# Patient Record
Sex: FEMALE | Race: WHITE
[De-identification: names, ages, dates, MRNs, and addresses within clinical notes are randomized per-mention and may not be internally consistent; named-entity substitution may affect disease eponyms.]

---

## 2019-07-12 ENCOUNTER — HOSPITAL ENCOUNTER (EMERGENCY)
Dept: HOSPITAL 5 - ED | Age: 39
Discharge: HOME | End: 2019-07-12
Payer: MEDICAID

## 2019-07-12 VITALS — DIASTOLIC BLOOD PRESSURE: 83 MMHG | SYSTOLIC BLOOD PRESSURE: 125 MMHG
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
## 2019-07-12 DIAGNOSIS — Z79.899: ICD-10-CM

## 2019-07-12 DIAGNOSIS — S93.401A: Primary | ICD-10-CM
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
## 2019-07-12 PROCEDURE — 99283 EMERGENCY DEPT VISIT LOW MDM: CPT

## 2019-07-12 NOTE — EMERGENCY DEPARTMENT REPORT
ED Lower Extremity HPI





- General


Chief Complaint: Extremity Injury, Lower


Stated Complaint: FALL/R ANKLE PAIN


Time Seen by Provider: 07/12/19 12:09


Source: patient


Mode of arrival: Ambulatory


Limitations: No Limitations





- History of Present Illness


Initial Comments: 





Pt presents with right ankle and foot pain 


states she was carrying a table and a chair and tripped and had an inversion 

injury 


never injured before 


ambulatory with discomfort





no PMHx


allergy: NSAIDS -PUD





non smoker


rare ETOH 


no drug use 





MD Complaint: ankle injury (right), foot injury (right)


-: This morning


Injury: Ankle: Right, Foot: Right


Type of Injury: inversion


Place: work


Severity scale (0 -10): 8


Improves With: rest


Worsens With: weight bearing, movement


Associated Symptoms: swelling, able to partially bear weight





- Related Data


                                  Previous Rx's











 Medication  Instructions  Recorded  Last Taken  Type


 


traMADol [Ultram 50 MG tab] 50 mg PO Q6HR PRN #12 tablet 07/12/19 Unknown Rx











                                    Allergies











Allergy/AdvReac Type Severity Reaction Status Date / Time


 


No Known Allergies Allergy   Unverified 07/12/19 11:48














ED Review of Systems


ROS: 


Stated complaint: FALL/R ANKLE PAIN


Other details as noted in HPI





Comment: All other systems reviewed and negative





ED Past Medical Hx





- Past Medical History


Previous Medical History?: No





- Surgical History


Additional Surgical History: cosmetic





- Social History


Smoking Status: Never Smoker


Substance Use Type: Alcohol





- Medications


Home Medications: 


                                Home Medications











 Medication  Instructions  Recorded  Confirmed  Last Taken  Type


 


traMADol [Ultram 50 MG tab] 50 mg PO Q6HR PRN #12 tablet 07/12/19  Unknown Rx














ED Physical Exam





- General


Limitations: No Limitations


General appearance: alert, in no apparent distress





- Head


Head exam: Present: atraumatic, normocephalic





- Eye


Eye exam: Present: normal appearance





- ENT


ENT exam: Present: mucous membranes moist





- Expanded Lower Extremity Exam


  ** Right


Hip exam: Present: full ROM


Upper Leg exam: Present: normal inspection, full ROM


Knee exam: Present: normal inspection, full ROM


Lower Leg exam: Present: normal inspection, full ROM


Ankle exam: Present: full ROM, tenderness, swelling


Foot/Toe exam: Present: full ROM.  Absent: tenderness, swelling


Neuro vascular tendon exam: Present: no vascular compromise





- Neurological Exam


Neurological exam: Present: alert, oriented X3





- Psychiatric


Psychiatric exam: Present: normal affect, normal mood





- Skin


Skin exam: Present: warm, dry, intact, normal color.  Absent: rash





ED Course


                                   Vital Signs











  07/12/19





  12:09


 


Temperature 98.3 F


 


Pulse Rate 80


 


Respiratory 18





Rate 


 


Blood Pressure 125/83


 


O2 Sat by Pulse 100





Oximetry 














ED Lower Extremity MDM





- Radiology Data


Radiology results: report reviewed





Ordering Physician: DL WILLSON 


Date of Service: 07/12/19 


Procedure(s): XR foot 3+V RT 


Accession Number(s): [id number] 





cc: DL WILLSON 





Fluoro Time In Minutes: 





RIGHT ANKLE 3 VIEWS 


RIGHT FOOT 3 VIEWS 





INDICATION / CLINICAL INFORMATION: 


Pain in right foot and ankle after furniture fell on foot/ankle. 





COMPARISON: 


None available. 





FINDINGS: 





BONES and JOINT(S): No acute fracture or subluxation. No significant arthritis. 


SOFT TISSUES: No significant abnormality. 





ADDITIONAL FINDINGS: None. 





IMPRESSION: 


No acute findings. 





Signer Name: Dylan Yo MD 


Signed: 7/12/2019 1:06 PM 


Workstation Name: [id number] 








Transcribed By: MN 


Dictated By: Dylan Yo MD 


Electronically Authenticated By: Dylan Yo MD 


Signed Date/Time: 07/12/19 1306 











DD/DT: 07/12/19 1305 


TD/TT:





- Medical Decision Making





Assessment evaluated by this provider [facility].  She is a 38-year-old female comes in

for right ankle pain x-rays were negative for any acute fractures.  There is the

patient has a right ankle sprain patient be placed on tramadol since she has an 

ibuprofen allergy as well as a ankle stirrup.  Patient follow-up with primary 

care provider if symptoms persist or gets worse


Critical care attestation.: 


If time is entered above; I have spent that time in minutes in the direct care 

of this critically ill patient, excluding procedure time.








ED Disposition


Clinical Impression: 


 Ankle sprain





Disposition: DC-01 TO HOME OR SELFCARE


Is pt being admited?: No


Does the pt Need Aspirin: No


Condition: Stable


Instructions:  Ankle Sprain (ED), Ankle Stirrup Splint (ED)


Additional Instructions: 


Pain medication as prescribed.  Apply ice to your ankle.  Wear stirrup ankle 

stirrup as prescribed.


Prescriptions: 


traMADol [Ultram 50 MG tab] 50 mg PO Q6HR PRN #12 tablet


 PRN Reason: Pain


Referrals: 


VICENTA CHASE MD [Primary Care Provider] - 3-5 Days


Forms:  Work/School Release Form(ED)

## 2019-07-12 NOTE — XRAY REPORT
RIGHT ANKLE 3 VIEWS

RIGHT FOOT 3 VIEWS



INDICATION / CLINICAL INFORMATION:

Pain in right foot and ankle after furniture fell on foot/ankle.



COMPARISON:

None available.

 

FINDINGS:



BONES and JOINT(S): No acute fracture or subluxation. No significant arthritis.

SOFT TISSUES: No significant abnormality.



ADDITIONAL FINDINGS: None.



IMPRESSION:

No acute findings.



Signer Name: Dylan Yo MD 

Signed: 7/12/2019 1:06 PM

 Workstation Name: [id number]

## 2019-07-12 NOTE — EVENT NOTE
ED Screening Note


ED Screening Note: 








Pt presents with right ankle and foot pain 


states she was carrying a table and a chair and tripped and had an inversion 

injury 


never injured before 


ambulatory with discomfort





no PMHx


allergy: NSAIDS -PUD





non smoker


rare ETOH 


no drug use 





This initial assessment/diagnostic orders/clinical plan/treatment(s) is/are 

subject to change based on patients health status, clinical progression and re-

assessment by fellow clinical providers in the ED. Further treatment and workup 

at subsequent clinical providers discretion. Patient/guardian urged not to elope

from the ED as their condition may be serious if not clinically assessed and 

managed. 





Initial orders include: XR of the right ankle, XR of the right foot

## 2019-07-12 NOTE — XRAY REPORT
RIGHT ANKLE 3 VIEWS

RIGHT FOOT 3 VIEWS



INDICATION / CLINICAL INFORMATION:

Pain in right foot and ankle after furniture fell on foot/ankle.



COMPARISON:

None available.

 

FINDINGS:



BONES and JOINT(S): No acute fracture or subluxation. No significant arthritis.

SOFT TISSUES: No significant abnormality.



ADDITIONAL FINDINGS: None.



IMPRESSION:

No acute findings.



Signer Name: Dylan Yo MD 

Signed: 7/12/2019 1:06 PM

 Workstation Name: [id number]